# Patient Record
Sex: FEMALE | Race: WHITE | URBAN - METROPOLITAN AREA
[De-identification: names, ages, dates, MRNs, and addresses within clinical notes are randomized per-mention and may not be internally consistent; named-entity substitution may affect disease eponyms.]

---

## 2021-11-29 ENCOUNTER — OFFICE VISIT (OUTPATIENT)
Dept: URBAN - METROPOLITAN AREA CLINIC 96 | Facility: CLINIC | Age: 46
End: 2021-11-29

## 2022-01-11 ENCOUNTER — OFFICE VISIT (OUTPATIENT)
Dept: URBAN - METROPOLITAN AREA CLINIC 96 | Facility: CLINIC | Age: 47
End: 2022-01-11
Payer: COMMERCIAL

## 2022-01-11 ENCOUNTER — LAB OUTSIDE AN ENCOUNTER (OUTPATIENT)
Dept: URBAN - METROPOLITAN AREA CLINIC 96 | Facility: CLINIC | Age: 47
End: 2022-01-11

## 2022-01-11 ENCOUNTER — WEB ENCOUNTER (OUTPATIENT)
Dept: URBAN - METROPOLITAN AREA CLINIC 96 | Facility: CLINIC | Age: 47
End: 2022-01-11

## 2022-01-11 DIAGNOSIS — R19.5 MUCUS IN STOOL: ICD-10-CM

## 2022-01-11 DIAGNOSIS — K92.1 HEMATOCHEZIA: ICD-10-CM

## 2022-01-11 DIAGNOSIS — Z12.11 SCREEN FOR COLON CANCER: ICD-10-CM

## 2022-01-11 DIAGNOSIS — R19.7 DIARRHEA: ICD-10-CM

## 2022-01-11 PROCEDURE — 99205 OFFICE O/P NEW HI 60 MIN: CPT | Performed by: INTERNAL MEDICINE

## 2022-01-11 RX ORDER — CIPROFLOXACIN HYDROCHLORIDE 500 MG/1
1 TABLET TABLET, FILM COATED ORAL
Qty: 28 TABLET | Refills: 0 | OUTPATIENT
Start: 2022-01-11 | End: 2022-01-25

## 2022-01-11 RX ORDER — POLYETHYLENE GLYCOL 3350, SODIUM SULFATE, SODIUM CHLORIDE, POTASSIUM CHLORIDE, ASCORBIC ACID, SODIUM ASCORBATE 140-9-5.2G
1 KIT KIT ORAL ONCE
Qty: 1 | Refills: 1 | OUTPATIENT
Start: 2022-01-11 | End: 2022-01-13

## 2022-01-11 RX ORDER — METRONIDAZOLE 500 MG/1
1 TABLET TABLET, FILM COATED ORAL
Qty: 28 TABLET | Refills: 0 | OUTPATIENT
Start: 2022-01-11 | End: 2022-01-25

## 2022-01-11 NOTE — HPI-TODAY'S VISIT:
Pt here for evaluation of bowel issues.    Today on 1/11/2022, pt reports that Since getting COVID in 9/2021, she has had a change in her BM function  She has a h/o some diarrhea.  She now has 2-5 BM per day.  She has not tried any OTC meds for this.  Now she has some blood and mucus in the stool as well.  She also has a gurgling noise in her abd, but no severe abd pain.  Nothing makes it better or worse.

## 2022-01-17 LAB
C DIFFICILE TOXIN GENE NAA: NEGATIVE
CAMPYLOBACTER CULTURE: (no result)
E COLI SHIGA TOXIN EIA: NEGATIVE
Lab: (no result)
Lab: (no result)
SALMONELLA/SHIGELLA SCREEN: (no result)

## 2022-01-18 ENCOUNTER — LAB OUTSIDE AN ENCOUNTER (OUTPATIENT)
Dept: URBAN - METROPOLITAN AREA CLINIC 96 | Facility: CLINIC | Age: 47
End: 2022-01-18

## 2022-01-18 ENCOUNTER — WEB ENCOUNTER (OUTPATIENT)
Dept: URBAN - METROPOLITAN AREA CLINIC 96 | Facility: CLINIC | Age: 47
End: 2022-01-18

## 2022-01-19 ENCOUNTER — WEB ENCOUNTER (OUTPATIENT)
Dept: URBAN - METROPOLITAN AREA CLINIC 96 | Facility: CLINIC | Age: 47
End: 2022-01-19

## 2022-01-19 PROBLEM — 235595009 GASTROESOPHAGEAL REFLUX DISEASE: Status: ACTIVE | Noted: 2022-01-18

## 2022-01-20 ENCOUNTER — CLAIMS CREATED FROM THE CLAIM WINDOW (OUTPATIENT)
Dept: URBAN - METROPOLITAN AREA CLINIC 4 | Facility: CLINIC | Age: 47
End: 2022-01-20
Payer: COMMERCIAL

## 2022-01-20 ENCOUNTER — OFFICE VISIT (OUTPATIENT)
Dept: URBAN - METROPOLITAN AREA SURGERY CENTER 18 | Facility: SURGERY CENTER | Age: 47
End: 2022-01-20
Payer: COMMERCIAL

## 2022-01-20 DIAGNOSIS — D12.5 ADENOMA OF SIGMOID COLON: ICD-10-CM

## 2022-01-20 DIAGNOSIS — Z12.11 AVERAGE RISK FOR CRC. DUE TO PT'S CO-MORBID STATE WITH END STAGE DEMENTIA, HIGH RISK FOR ANESTHESIA (PER NEUROLOGY); INABILITY TO TAKE A BOWEL PREP....WOULD NOT ADVISE ANY COLORECTAL CANCER SCREENING INCLUDING STOOL TEST FOR FECAL BLOOD.: ICD-10-CM

## 2022-01-20 DIAGNOSIS — K31.89 ACQUIRED DEFORMITY OF DUODENUM: ICD-10-CM

## 2022-01-20 DIAGNOSIS — C18.9 MALIGNANT NEOPLASM OF COLON, UNSPECIFIED: ICD-10-CM

## 2022-01-20 DIAGNOSIS — C19 ADENOCARCINOMA OF RECTOSIGMOID JUNCTION: ICD-10-CM

## 2022-01-20 DIAGNOSIS — R12 BURNING REFLUX: ICD-10-CM

## 2022-01-20 DIAGNOSIS — K63.89 BACTERIAL OVERGROWTH SYNDROME: ICD-10-CM

## 2022-01-20 PROCEDURE — G8907 PT DOC NO EVENTS ON DISCHARG: HCPCS | Performed by: INTERNAL MEDICINE

## 2022-01-20 PROCEDURE — 88342 IMHCHEM/IMCYTCHM 1ST ANTB: CPT | Performed by: PATHOLOGY

## 2022-01-20 PROCEDURE — 45380 COLONOSCOPY AND BIOPSY: CPT | Performed by: INTERNAL MEDICINE

## 2022-01-20 PROCEDURE — 88341 IMHCHEM/IMCYTCHM EA ADD ANTB: CPT | Performed by: PATHOLOGY

## 2022-01-20 PROCEDURE — 43239 EGD BIOPSY SINGLE/MULTIPLE: CPT | Performed by: INTERNAL MEDICINE

## 2022-01-20 PROCEDURE — 45381 COLONOSCOPY SUBMUCOUS NJX: CPT | Performed by: INTERNAL MEDICINE

## 2022-01-20 RX ORDER — CIPROFLOXACIN HYDROCHLORIDE 500 MG/1
1 TABLET TABLET, FILM COATED ORAL
Qty: 28 TABLET | Refills: 0 | Status: ACTIVE | COMMUNITY
Start: 2022-01-11 | End: 2022-01-25

## 2022-01-20 RX ORDER — METRONIDAZOLE 500 MG/1
1 TABLET TABLET, FILM COATED ORAL
Qty: 28 TABLET | Refills: 0 | Status: ACTIVE | COMMUNITY
Start: 2022-01-11 | End: 2022-01-25

## 2022-01-24 ENCOUNTER — WEB ENCOUNTER (OUTPATIENT)
Dept: URBAN - METROPOLITAN AREA CLINIC 96 | Facility: CLINIC | Age: 47
End: 2022-01-24

## 2022-01-25 ENCOUNTER — WEB ENCOUNTER (OUTPATIENT)
Dept: URBAN - METROPOLITAN AREA CLINIC 96 | Facility: CLINIC | Age: 47
End: 2022-01-25

## 2022-01-27 ENCOUNTER — OFFICE VISIT (OUTPATIENT)
Dept: URBAN - METROPOLITAN AREA CLINIC 50 | Facility: CLINIC | Age: 47
End: 2022-01-27

## 2022-02-14 ENCOUNTER — WEB ENCOUNTER (OUTPATIENT)
Dept: URBAN - METROPOLITAN AREA CLINIC 96 | Facility: CLINIC | Age: 47
End: 2022-02-14

## 2022-02-24 ENCOUNTER — LAB OUTSIDE AN ENCOUNTER (OUTPATIENT)
Dept: URBAN - METROPOLITAN AREA TELEHEALTH 2 | Facility: TELEHEALTH | Age: 47
End: 2022-02-24

## 2022-02-25 ENCOUNTER — TELEPHONE ENCOUNTER (OUTPATIENT)
Dept: URBAN - METROPOLITAN AREA CLINIC 92 | Facility: CLINIC | Age: 47
End: 2022-02-25

## 2022-02-25 ENCOUNTER — OFFICE VISIT (OUTPATIENT)
Dept: URBAN - METROPOLITAN AREA TELEHEALTH 2 | Facility: TELEHEALTH | Age: 47
End: 2022-02-25
Payer: COMMERCIAL

## 2022-02-25 DIAGNOSIS — R19.7 DIARRHEA: ICD-10-CM

## 2022-02-25 DIAGNOSIS — K92.1 HEMATOCHEZIA: ICD-10-CM

## 2022-02-25 DIAGNOSIS — R19.5 MUCUS IN STOOL: ICD-10-CM

## 2022-02-25 DIAGNOSIS — Z91.89 COLON CANCER HIGH RISK: ICD-10-CM

## 2022-02-25 DIAGNOSIS — C18.9 COLON CANCER: ICD-10-CM

## 2022-02-25 PROCEDURE — 99214 OFFICE O/P EST MOD 30 MIN: CPT | Performed by: INTERNAL MEDICINE

## 2022-02-25 NOTE — HPI-TODAY'S VISIT:
Pt here for f/u after colon cancer surgery.  Previously on 1/11/2022, pt reports that Since getting COVID in 9/2021, she has had a change in her BM function  She has a h/o some diarrhea.  She now has 2-5 BM per day.  She has not tried any OTC meds for this.  Now she has some blood and mucus in the stool as well.  She also has a gurgling noise in her abd, but no severe abd pain.  Nothing makes it better or worse.  . Today on 2/24/2022, pt reports that she is doing well since the surgery.  She is noticing  that she is having 7 BM per day.  Very small amount of blood in the stool, specks.  No hard stools. Normally, she has 1 BM per day.  Saw Dr. Garza in clinic. . (A) Duodenum, Biopsy: NO SIGNIFICANT ABNORMALITY. (B) Stomach, Biopsy: NO SIGNIFICANT ABNORMALITY. (C) Colon, Ascending, Polypectomy: PROMINENT MUCOSAL LYMPHOID AGGREGATES. Negative for Dysplasia or Malignancy. (D) Colon, Sigmoid, Polypectomy: TUBULAR ADENOMA(S). (E) Colon, Recto-Sigmoid, Biopsy: INVASIVE ADENOCARCINOMA, ARISING IN AN ADENOMA/MODERATELY DIFFERENTIATED . This addendum is issued to add IHC results for Part E. Immunohistochemical (IHC) analysis that can serve as pre-screening test for the presence or absence of mismatch repair (MMR) mutation has been done on the neoplasm, in order to evaluate for risk of hereditary non-polyposis colorectal cancer (HNPCC)/RUIZ SYNDROME. IMMUNOHISTOCHEMICAL STAINS ([facility], [place], [place]): MLH1: NORMAL EXPRESSION (>50%). MSH2: NORMAL EXPRESSION (>50%). MSH6: NORMAL EXPRESSION (>50%). PMS2: NORMAL EXPRESSION (>50%). INTERPRETATION: Normal expression of MLH1, MSH2, MSH6, and PMS2 protein in the tumor, as is demonstrated by immunohistochemical (IHC) stains, is interpreted as NEGATIVE for mismatch repair (MMR) mutation screening test. The negative results usually suggest that germline gene mutation testing is not warranted, except that the patient has additional risk factors of HNPCC/Ruiz Syndrome clinically.

## 2022-04-11 ENCOUNTER — WEB ENCOUNTER (OUTPATIENT)
Dept: URBAN - METROPOLITAN AREA CLINIC 96 | Facility: CLINIC | Age: 47
End: 2022-04-11

## 2022-04-12 ENCOUNTER — WEB ENCOUNTER (OUTPATIENT)
Dept: URBAN - METROPOLITAN AREA CLINIC 96 | Facility: CLINIC | Age: 47
End: 2022-04-12

## 2022-05-08 ENCOUNTER — WEB ENCOUNTER (OUTPATIENT)
Dept: URBAN - METROPOLITAN AREA CLINIC 96 | Facility: CLINIC | Age: 47
End: 2022-05-08

## 2022-05-18 ENCOUNTER — OFFICE VISIT (OUTPATIENT)
Dept: URBAN - METROPOLITAN AREA CLINIC 96 | Facility: CLINIC | Age: 47
End: 2022-05-18
Payer: COMMERCIAL

## 2022-05-18 DIAGNOSIS — C18.9 COLON CANCER: ICD-10-CM

## 2022-05-18 DIAGNOSIS — R19.5 MUCUS IN STOOL: ICD-10-CM

## 2022-05-18 DIAGNOSIS — Z91.89 COLON CANCER HIGH RISK: ICD-10-CM

## 2022-05-18 DIAGNOSIS — R19.7 DIARRHEA: ICD-10-CM

## 2022-05-18 DIAGNOSIS — K92.1 HEMATOCHEZIA: ICD-10-CM

## 2022-05-18 DIAGNOSIS — K59.00 CONSTIPATION, UNSPECIFIED: ICD-10-CM

## 2022-05-18 PROBLEM — 363406005 MALIGNANT TUMOR OF COLON: Status: ACTIVE | Noted: 2022-02-24

## 2022-05-18 PROBLEM — 14760008 CONSTIPATION: Status: ACTIVE | Noted: 2022-05-18

## 2022-05-18 PROCEDURE — 99215 OFFICE O/P EST HI 40 MIN: CPT | Performed by: INTERNAL MEDICINE

## 2022-05-18 RX ORDER — METRONIDAZOLE 500 MG/1
1 TABLET TABLET, FILM COATED ORAL
Qty: 28 TABLET | Refills: 0 | OUTPATIENT
Start: 2022-05-18 | End: 2022-06-01

## 2022-05-18 RX ORDER — HYOSCYAMINE SULFATE 0.12 MG/1
1 TABLET AS NEEDED TABLET ORAL
Qty: 60 | Refills: 3 | OUTPATIENT
Start: 2022-05-18 | End: 2022-09-15

## 2022-05-18 NOTE — HPI-TODAY'S VISIT:
Pt here for f/u after colon cancer surgery.  Previously on 1/11/2022, pt reports that Since getting COVID in 9/2021, she has had a change in her BM function  She has a h/o some diarrhea.  She now has 2-5 BM per day.  She has not tried any OTC meds for this.  Now she has some blood and mucus in the stool as well.  She also has a gurgling noise in her abd, but no severe abd pain.  Nothing makes it better or worse.  . Previously on 2/24/2022, pt reports that she is doing well since the surgery.  She is noticing  that she is having 7 BM per day.  Very small amount of blood in the stool, specks.  No hard stools. Normally, she has 1 BM per day.  Saw Dr. Garza in clinic. . Today on 5/18/2022, pt reports that she was doing very well and now is having diarrhea recurrent and then took too much Imodium and now is constipated.  After eating something, she has diarrhea that is green and has rectal pressure and incomplete emptying.  Started psyllium and oatmeal. . (A) Duodenum, Biopsy: NO SIGNIFICANT ABNORMALITY. (B) Stomach, Biopsy: NO SIGNIFICANT ABNORMALITY. (C) Colon, Ascending, Polypectomy: PROMINENT MUCOSAL LYMPHOID AGGREGATES. Negative for Dysplasia or Malignancy. (D) Colon, Sigmoid, Polypectomy: TUBULAR ADENOMA(S). (E) Colon, Recto-Sigmoid, Biopsy: INVASIVE ADENOCARCINOMA, ARISING IN AN ADENOMA/MODERATELY DIFFERENTIATED . This addendum is issued to add IHC results for Part E. Immunohistochemical (IHC) analysis that can serve as pre-screening test for the presence or absence of mismatch repair (MMR) mutation has been done on the neoplasm, in order to evaluate for risk of hereditary non-polyposis colorectal cancer (HNPCC)/RUIZ SYNDROME. IMMUNOHISTOCHEMICAL STAINS ([facility], [place], [place]): MLH1: NORMAL EXPRESSION (>50%). MSH2: NORMAL EXPRESSION (>50%). MSH6: NORMAL EXPRESSION (>50%). PMS2: NORMAL EXPRESSION (>50%). INTERPRETATION: Normal expression of MLH1, MSH2, MSH6, and PMS2 protein in the tumor, as is demonstrated by immunohistochemical (IHC) stains, is interpreted as NEGATIVE for mismatch repair (MMR) mutation screening test. The negative results usually suggest that germline gene mutation testing is not warranted, except that the patient has additional risk factors of HNPCC/Ruiz Syndrome clinically.

## 2022-05-24 ENCOUNTER — WEB ENCOUNTER (OUTPATIENT)
Dept: URBAN - METROPOLITAN AREA CLINIC 96 | Facility: CLINIC | Age: 47
End: 2022-05-24

## 2022-05-24 ENCOUNTER — TELEPHONE ENCOUNTER (OUTPATIENT)
Dept: URBAN - METROPOLITAN AREA CLINIC 92 | Facility: CLINIC | Age: 47
End: 2022-05-24

## 2022-05-24 LAB
C DIFFICILE TOXIN GENE NAA: POSITIVE
CAMPYLOBACTER CULTURE: (no result)
CRYPTOSPORIDIUM EIA: NEGATIVE
E COLI SHIGA TOXIN EIA: NEGATIVE
GIARDIA LAMBLIA AG, EIA: NEGATIVE
Lab: (no result)
Lab: (no result)
SALMONELLA/SHIGELLA SCREEN: (no result)

## 2022-05-24 RX ORDER — METRONIDAZOLE 500 MG/1
1 TABLET TABLET, FILM COATED ORAL
Qty: 56 TABLET | Refills: 1 | OUTPATIENT
Start: 2022-05-24 | End: 2022-06-21

## 2022-05-24 RX ORDER — VANCOMYCIN HYDROCHLORIDE 250 MG/1
2 CAPSULES CAPSULE ORAL
Qty: 112 CAPSULE | Refills: 1 | OUTPATIENT
Start: 2022-05-24 | End: 2022-06-21

## 2022-05-26 ENCOUNTER — WEB ENCOUNTER (OUTPATIENT)
Dept: URBAN - METROPOLITAN AREA CLINIC 96 | Facility: CLINIC | Age: 47
End: 2022-05-26

## 2023-05-01 ENCOUNTER — OFFICE VISIT (OUTPATIENT)
Dept: URBAN - METROPOLITAN AREA CLINIC 12 | Facility: CLINIC | Age: 48
End: 2023-05-01

## 2023-05-02 ENCOUNTER — OFFICE VISIT (OUTPATIENT)
Dept: URBAN - METROPOLITAN AREA CLINIC 96 | Facility: CLINIC | Age: 48
End: 2023-05-02

## 2023-05-02 RX ORDER — SERTRALINE HYDROCHLORIDE 25 MG/1
TAKE ONE TABLET BY MOUTH ONE TIME DAILY WITH 50MG TABLET TABLET, FILM COATED ORAL
Qty: 90 UNSPECIFIED | Refills: 0 | Status: ACTIVE | COMMUNITY

## 2023-05-02 RX ORDER — KETOCONAZOLE 20 MG/ML
SHAMPOO SCALP TWICE TO THREE TIMES WEEKLY ; LEVING LATHER ON FOR 5 MINUTES BEFORE RINSING SHAMPOO, SUSPENSION TOPICAL
Qty: 120 UNSPECIFIED | Refills: 0 | Status: ACTIVE | COMMUNITY

## 2023-05-02 RX ORDER — MELOXICAM 15 MG/1
TAKE ONE TABLET BY MOUTH ONE TIME DAILY TABLET ORAL
Qty: 30 UNSPECIFIED | Refills: 0 | Status: ACTIVE | COMMUNITY

## 2023-07-12 ENCOUNTER — OFFICE VISIT (OUTPATIENT)
Dept: URBAN - METROPOLITAN AREA CLINIC 96 | Facility: CLINIC | Age: 48
End: 2023-07-12

## 2023-07-18 ENCOUNTER — OFFICE VISIT (OUTPATIENT)
Dept: URBAN - METROPOLITAN AREA CLINIC 96 | Facility: CLINIC | Age: 48
End: 2023-07-18

## 2023-07-18 ENCOUNTER — LAB OUTSIDE AN ENCOUNTER (OUTPATIENT)
Dept: URBAN - METROPOLITAN AREA CLINIC 96 | Facility: CLINIC | Age: 48
End: 2023-07-18

## 2023-07-18 ENCOUNTER — TELEPHONE ENCOUNTER (OUTPATIENT)
Dept: URBAN - METROPOLITAN AREA CLINIC 96 | Facility: CLINIC | Age: 48
End: 2023-07-18

## 2023-07-18 ENCOUNTER — DASHBOARD ENCOUNTERS (OUTPATIENT)
Age: 48
End: 2023-07-18

## 2023-07-18 VITALS — HEIGHT: 64 IN

## 2023-07-18 RX ORDER — SERTRALINE HYDROCHLORIDE 25 MG/1
TAKE ONE TABLET BY MOUTH ONE TIME DAILY WITH 50MG TABLET TABLET, FILM COATED ORAL
Qty: 90 UNSPECIFIED | Refills: 0 | Status: ACTIVE | COMMUNITY

## 2023-07-18 RX ORDER — KETOCONAZOLE 20 MG/ML
SHAMPOO SCALP TWICE TO THREE TIMES WEEKLY ; LEVING LATHER ON FOR 5 MINUTES BEFORE RINSING SHAMPOO, SUSPENSION TOPICAL
Qty: 120 UNSPECIFIED | Refills: 0 | Status: ACTIVE | COMMUNITY

## 2023-07-18 RX ORDER — MELOXICAM 15 MG/1
TAKE ONE TABLET BY MOUTH ONE TIME DAILY TABLET ORAL
Qty: 30 UNSPECIFIED | Refills: 0 | Status: ACTIVE | COMMUNITY

## 2025-04-10 NOTE — PHYSICAL EXAM MUSCULOSKELETAL:
Kaitlin called wanting an update on the patient's olmesartan-hydrochlorothiazide (BENICAR HCT). Informed staff that we have tried to call this patient to try and get a blood pressure reading. Informed staff that once contact has been made with patient then we would fill medication   normal gait and station , no tenderness or deformities present